# Patient Record
Sex: FEMALE | Race: WHITE | Employment: OTHER | URBAN - METROPOLITAN AREA
[De-identification: names, ages, dates, MRNs, and addresses within clinical notes are randomized per-mention and may not be internally consistent; named-entity substitution may affect disease eponyms.]

---

## 2017-12-11 PROBLEM — F02.80 DEMENTIA DUE TO PARKINSON'S DISEASE WITHOUT BEHAVIORAL DISTURBANCE (HCC): Status: ACTIVE | Noted: 2017-12-11

## 2017-12-11 PROBLEM — G20 DEMENTIA DUE TO PARKINSON'S DISEASE WITHOUT BEHAVIORAL DISTURBANCE (HCC): Status: ACTIVE | Noted: 2017-12-11

## 2017-12-11 PROBLEM — G20 PARKINSON'S DISEASE (HCC): Status: ACTIVE | Noted: 2017-12-11

## 2018-03-26 ENCOUNTER — NURSE ONLY (OUTPATIENT)
Dept: LAB | Age: 83
End: 2018-03-26
Attending: NURSE PRACTITIONER

## 2018-03-26 ENCOUNTER — SNF VISIT (OUTPATIENT)
Dept: INTERNAL MEDICINE CLINIC | Age: 83
End: 2018-03-26

## 2018-03-26 DIAGNOSIS — G20 PARKINSON'S DISEASE (HCC): ICD-10-CM

## 2018-03-26 DIAGNOSIS — I10 ESSENTIAL HYPERTENSION, MALIGNANT: ICD-10-CM

## 2018-03-26 DIAGNOSIS — I48.0 PAROXYSMAL ATRIAL FIBRILLATION (HCC): ICD-10-CM

## 2018-03-26 DIAGNOSIS — F02.80 DEMENTIA DUE TO PARKINSON'S DISEASE WITHOUT BEHAVIORAL DISTURBANCE (HCC): ICD-10-CM

## 2018-03-26 DIAGNOSIS — F02.80 ALZHEIMER'S DISEASE (HCC): ICD-10-CM

## 2018-03-26 DIAGNOSIS — Z95.0 CARDIAC PACEMAKER IN SITU: ICD-10-CM

## 2018-03-26 DIAGNOSIS — D68.52 HETEROZYGOUS FOR PROTHROMBIN G20210A MUTATION (HCC): Primary | ICD-10-CM

## 2018-03-26 DIAGNOSIS — G30.9 ALZHEIMER'S DISEASE (HCC): ICD-10-CM

## 2018-03-26 DIAGNOSIS — G20 DEMENTIA DUE TO PARKINSON'S DISEASE WITHOUT BEHAVIORAL DISTURBANCE (HCC): ICD-10-CM

## 2018-03-26 LAB
25-HYDROXYVITAMIN D (TOTAL): 19.3 NG/ML (ref 30–100)
ALBUMIN SERPL-MCNC: 2.9 G/DL (ref 3.5–4.8)
ALP LIVER SERPL-CCNC: 66 U/L (ref 55–142)
ALT SERPL-CCNC: 54 U/L (ref 14–54)
AST SERPL-CCNC: 41 U/L (ref 15–41)
BASOPHILS # BLD AUTO: 0.05 X10(3) UL (ref 0–0.1)
BASOPHILS NFR BLD AUTO: 0.8 %
BILIRUB SERPL-MCNC: 1.5 MG/DL (ref 0.1–2)
BUN BLD-MCNC: 14 MG/DL (ref 8–20)
CALCIUM BLD-MCNC: 8 MG/DL (ref 8.3–10.3)
CHLORIDE: 105 MMOL/L (ref 101–111)
CO2: 27 MMOL/L (ref 22–32)
CREAT BLD-MCNC: 0.8 MG/DL (ref 0.55–1.02)
EOSINOPHIL # BLD AUTO: 0.07 X10(3) UL (ref 0–0.3)
EOSINOPHIL NFR BLD AUTO: 1.1 %
ERYTHROCYTE [DISTWIDTH] IN BLOOD BY AUTOMATED COUNT: 17.1 % (ref 11.5–16)
GLUCOSE BLD-MCNC: 92 MG/DL (ref 70–99)
HAV IGM SER QL: 2.2 MG/DL (ref 1.7–3)
HCT VFR BLD AUTO: 39.5 % (ref 34–50)
HGB BLD-MCNC: 12.8 G/DL (ref 12–16)
IMMATURE GRANULOCYTE COUNT: 0.04 X10(3) UL (ref 0–1)
IMMATURE GRANULOCYTE RATIO %: 0.6 %
LYMPHOCYTES # BLD AUTO: 0.99 X10(3) UL (ref 0.9–4)
LYMPHOCYTES NFR BLD AUTO: 15.2 %
M PROTEIN MFR SERPL ELPH: 5.5 G/DL (ref 6.1–8.3)
MCH RBC QN AUTO: 30.3 PG (ref 27–33.2)
MCHC RBC AUTO-ENTMCNC: 32.4 G/DL (ref 31–37)
MCV RBC AUTO: 93.4 FL (ref 81–100)
MONOCYTES # BLD AUTO: 0.79 X10(3) UL (ref 0.1–1)
MONOCYTES NFR BLD AUTO: 12.1 %
NEUTROPHIL ABS PRELIM: 4.58 X10 (3) UL (ref 1.3–6.7)
NEUTROPHILS # BLD AUTO: 4.58 X10(3) UL (ref 1.3–6.7)
NEUTROPHILS NFR BLD AUTO: 70.2 %
PLATELET # BLD AUTO: 161 10(3)UL (ref 150–450)
POTASSIUM SERPL-SCNC: 3.5 MMOL/L (ref 3.6–5.1)
RBC # BLD AUTO: 4.23 X10(6)UL (ref 3.8–5.1)
RED CELL DISTRIBUTION WIDTH-SD: 58.6 FL (ref 35.1–46.3)
SODIUM SERPL-SCNC: 140 MMOL/L (ref 136–144)
WBC # BLD AUTO: 6.5 X10(3) UL (ref 4–13)

## 2018-03-26 PROCEDURE — 83735 ASSAY OF MAGNESIUM: CPT

## 2018-03-26 PROCEDURE — 82306 VITAMIN D 25 HYDROXY: CPT

## 2018-03-26 PROCEDURE — 99310 SBSQ NF CARE HIGH MDM 45: CPT | Performed by: NURSE PRACTITIONER

## 2018-03-26 PROCEDURE — 36415 COLL VENOUS BLD VENIPUNCTURE: CPT

## 2018-03-26 PROCEDURE — 80053 COMPREHEN METABOLIC PANEL: CPT

## 2018-03-26 PROCEDURE — 85025 COMPLETE CBC W/AUTO DIFF WBC: CPT

## 2018-03-26 NOTE — PROGRESS NOTES
Lawrence Fuentes  : 1934  Age 80year old  female patient is admitted to The [address] [place] [address] for rehabilitation due to chest pain and pacemaker replacement.      [facility] Admit date:  3/17/2018  Discharge date to [facility]:  3/23/2018  TATYANA 25 MG Oral Tab Take 1 tablet (25 mg total) by mouth nightly. Disp: 30 tablet Rfl: 1   Levothyroxine Sodium 100 MCG Oral Tab Take 1 tablet (100 mcg total) by mouth daily.  Disp: 90 tablet Rfl: 0   Multiple Vitamins-Minerals (EYE VITAMINS OR) Take 1 tablet by distention  LYMPHATIC:no lymphedema  MUSCULOSKELETAL:  Weakness and deconditioning  EXTREMITIES/VASCULAR:no cyanosis, clubbing or edema and radial pulses 2+  NEUROLOGIC: intact; no sensorimotor deficit, follows commands  PSYCHIATRIC: alert and oriented x 3 infarction or focal mass lesion. Intracranial atherosclerosis. Areas of decreased attenuation noted in the periventricular and subcortical white matter most consistent with chronic small vessel ischemic changes. Visualized paranasal sinuses are clear.  Righ note all Radiology and Diagnostic Imaging results are subject to interpretation according to your specific medical history and condition. The results in this report should be reviewed with your ordering or primary care physician as appropriate.     Janett Celeste HTN/AFIB/CAD  CHF SSS   Pacemaker- new with steri strips in place   No need for anticoagulation per cardiology due to falls   Fluid restriction of 64 ounces   Cardiac diet   Not to lift her arm above her head for 6 weeks.    toprol XL   History of ablat

## 2018-03-28 ENCOUNTER — NURSE ONLY (OUTPATIENT)
Dept: LAB | Age: 83
End: 2018-03-28
Attending: NURSE PRACTITIONER

## 2018-03-28 DIAGNOSIS — I48.91 ATRIAL FIBRILLATION (HCC): Primary | ICD-10-CM

## 2018-03-28 LAB
BUN BLD-MCNC: 13 MG/DL (ref 8–20)
CALCIUM BLD-MCNC: 8.1 MG/DL (ref 8.3–10.3)
CHLORIDE: 105 MMOL/L (ref 101–111)
CO2: 27 MMOL/L (ref 22–32)
CREAT BLD-MCNC: 0.69 MG/DL (ref 0.55–1.02)
GLUCOSE BLD-MCNC: 87 MG/DL (ref 70–99)
POTASSIUM SERPL-SCNC: 3.5 MMOL/L (ref 3.6–5.1)
SODIUM SERPL-SCNC: 138 MMOL/L (ref 136–144)

## 2018-03-28 PROCEDURE — 80048 BASIC METABOLIC PNL TOTAL CA: CPT

## 2018-03-29 ENCOUNTER — SNF VISIT (OUTPATIENT)
Dept: INTERNAL MEDICINE CLINIC | Age: 83
End: 2018-03-29

## 2018-03-29 VITALS
RESPIRATION RATE: 16 BRPM | DIASTOLIC BLOOD PRESSURE: 72 MMHG | OXYGEN SATURATION: 95 % | HEART RATE: 61 BPM | SYSTOLIC BLOOD PRESSURE: 154 MMHG | TEMPERATURE: 97 F

## 2018-03-29 DIAGNOSIS — F02.80 DEMENTIA DUE TO PARKINSON'S DISEASE WITHOUT BEHAVIORAL DISTURBANCE (HCC): ICD-10-CM

## 2018-03-29 DIAGNOSIS — G20 DEMENTIA DUE TO PARKINSON'S DISEASE WITHOUT BEHAVIORAL DISTURBANCE (HCC): ICD-10-CM

## 2018-03-29 DIAGNOSIS — Z91.81 RISK FOR FALLS: ICD-10-CM

## 2018-03-29 DIAGNOSIS — Z95.0 CARDIAC PACEMAKER IN SITU: Primary | ICD-10-CM

## 2018-03-29 DIAGNOSIS — G20 PARKINSON'S DISEASE (HCC): ICD-10-CM

## 2018-03-29 PROCEDURE — 99308 SBSQ NF CARE LOW MDM 20: CPT | Performed by: NURSE PRACTITIONER

## 2018-03-29 NOTE — PROGRESS NOTES
Johanna Cy, 38/1934, 80year old, female  Chief Complaint:  Patient presents with:   Follow - Up: s/p fall/angina and PPM replacement       Subjective:   PMH significant for dementia, MARIO on CPAP, A Fib s/p PPM, MR, HL, post-ablative hypothyroidism and Weakness and deconditioning  EXTREMITIES/VASCULAR:no cyanosis or clubbing, 2+ pedal edema and radial pulses 2+  NEUROLOGIC: intact; no sensorimotor deficit, follows commands  PSYCHIATRIC: alert and oriented x 1; recognizes family at bedside, affect appropr

## 2018-04-01 NOTE — PROGRESS NOTES
[address]: Michael Walls MD     1934 MRN [id number]   [facility]  Admission        [facility] Discharge   PCP Kellee Aceves 15 of Discharge         Date of Admission:    3/23/18  [facility] history of HTN, HL, Afib, CKD, Vitamin D ddef, pleural effusion Hypothyroidism, MARIO with cpap, lumbar disc disease, generalized weakness and physical deconditioning.    Patient denies any shortness of breath, denies chest pain and denies any recent fevers o Estimated body mass index is 23.17 kg/m² as calculated from the following:    Height as of 12/11/17: 64\". Weight as of 12/11/17: 135 lb. Wound: left chest wall, no infection along incisional site.      Physical Exam   Constitutional: She appears wel changes with history of dementia, encephalopathy, and pacemaker replacement. \  1. Dementia due to Parkinson's disease without behavioral disturbance ([facility] [address]  -continue with xanax and seroquel    2.  Paroxysmal atrial fibrillation (HCC)  -stable, continue wit

## 2018-04-04 ENCOUNTER — SNF DISCHARGE (OUTPATIENT)
Dept: INTERNAL MEDICINE CLINIC | Age: 83
End: 2018-04-04

## 2018-04-04 DIAGNOSIS — Z95.0 CARDIAC PACEMAKER IN SITU: Primary | ICD-10-CM

## 2018-04-04 PROCEDURE — 99315 NF DSCHRG MGMT 30 MIN/LESS: CPT | Performed by: NURSE PRACTITIONER

## 2018-04-06 NOTE — PROGRESS NOTES
Ebony Kim, 38/1934, 80year old, female is being discharged from Facility: [address] [place] [address] at [id number] [place] [address]    Date of Admission:3/23/2018    Date of Discharge: 4/4/2018                            Admitting Diagnoses: chest pain w deconditioning  EXTREMITIES/VASCULAR:no cyanosis, clubbing or edema and radial pulses 2+  NEUROLOGIC: intact; no sensorimotor deficit, follows commands  PSYCHIATRIC: alert and oriented x 3; affect appropriate    Skilled Nursing Facility Course:    [facility] bili   Monitor labs      Unwitnessed fall   Pt with dementia and impulsive behavior  CT negative      Hypothyroidism  Replacement     Vitamin D Deficiency   Replacement     Supplement  Multivitamin      Deconditioning/weakness  PT/OT to eval and treat  [facility]

## 2018-04-20 PROBLEM — D69.6 THROMBOCYTOPENIA (HCC): Status: ACTIVE | Noted: 2018-04-20

## 2018-04-20 PROBLEM — I27.20 PULMONARY HYPERTENSION (HCC): Status: ACTIVE | Noted: 2018-04-20

## 2018-11-05 PROCEDURE — 87186 SC STD MICRODIL/AGAR DIL: CPT | Performed by: OTHER

## 2018-11-05 PROCEDURE — 87088 URINE BACTERIA CULTURE: CPT | Performed by: OTHER

## 2018-11-05 PROCEDURE — 87086 URINE CULTURE/COLONY COUNT: CPT | Performed by: OTHER

## 2018-11-05 PROCEDURE — 81001 URINALYSIS AUTO W/SCOPE: CPT | Performed by: OTHER

## 2018-12-07 PROCEDURE — 81001 URINALYSIS AUTO W/SCOPE: CPT | Performed by: INTERNAL MEDICINE

## 2018-12-07 PROCEDURE — 87086 URINE CULTURE/COLONY COUNT: CPT | Performed by: INTERNAL MEDICINE

## 2019-04-24 PROCEDURE — 87086 URINE CULTURE/COLONY COUNT: CPT | Performed by: OTHER

## 2019-04-24 PROCEDURE — 81001 URINALYSIS AUTO W/SCOPE: CPT | Performed by: OTHER

## 2019-08-29 PROCEDURE — 81001 URINALYSIS AUTO W/SCOPE: CPT | Performed by: OTHER

## 2019-08-29 PROCEDURE — 87086 URINE CULTURE/COLONY COUNT: CPT | Performed by: OTHER

## 2019-12-23 ENCOUNTER — NURSE ONLY (OUTPATIENT)
Dept: LAB | Age: 84
End: 2019-12-23
Attending: FAMILY MEDICINE
Payer: MEDICARE

## 2019-12-23 ENCOUNTER — INITIAL APN SNF VISIT (OUTPATIENT)
Dept: INTERNAL MEDICINE CLINIC | Age: 84
End: 2019-12-23

## 2019-12-23 VITALS
TEMPERATURE: 99 F | BODY MASS INDEX: 25 KG/M2 | SYSTOLIC BLOOD PRESSURE: 136 MMHG | WEIGHT: 147 LBS | DIASTOLIC BLOOD PRESSURE: 57 MMHG | RESPIRATION RATE: 20 BRPM | OXYGEN SATURATION: 99 % | HEART RATE: 69 BPM

## 2019-12-23 DIAGNOSIS — I27.20 PULMONARY HYPERTENSION (HCC): ICD-10-CM

## 2019-12-23 DIAGNOSIS — F02.80 DEMENTIA DUE TO PARKINSON'S DISEASE WITHOUT BEHAVIORAL DISTURBANCE (HCC): ICD-10-CM

## 2019-12-23 DIAGNOSIS — G20 DEMENTIA DUE TO PARKINSON'S DISEASE WITHOUT BEHAVIORAL DISTURBANCE (HCC): ICD-10-CM

## 2019-12-23 DIAGNOSIS — E03.9 HYPOTHYROIDISM (ACQUIRED): ICD-10-CM

## 2019-12-23 DIAGNOSIS — R19.7 DIARRHEA OF PRESUMED INFECTIOUS ORIGIN: ICD-10-CM

## 2019-12-23 DIAGNOSIS — Z95.0 CARDIAC PACEMAKER IN SITU: ICD-10-CM
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
## 2019-12-23 DIAGNOSIS — G20 PARKINSON'S DISEASE (HCC): ICD-10-CM

## 2019-12-23 DIAGNOSIS — R53.81 PHYSICAL DECONDITIONING: ICD-10-CM

## 2019-12-23 DIAGNOSIS — Z96.641 HISTORY OF RIGHT HIP HEMIARTHROPLASTY: ICD-10-CM

## 2019-12-23 DIAGNOSIS — M62.81 MUSCLE WEAKNESS (GENERALIZED): Primary | ICD-10-CM

## 2019-12-23 PROCEDURE — 99309 SBSQ NF CARE MODERATE MDM 30: CPT | Performed by: NURSE PRACTITIONER

## 2019-12-23 PROCEDURE — 82306 VITAMIN D 25 HYDROXY: CPT

## 2019-12-23 PROCEDURE — 80053 COMPREHEN METABOLIC PANEL: CPT

## 2019-12-23 PROCEDURE — 85025 COMPLETE CBC W/AUTO DIFF WBC: CPT

## 2019-12-23 RX ORDER — BISACODYL 10 MG
10 SUPPOSITORY, RECTAL RECTAL DAILY PRN
COMMUNITY

## 2019-12-23 RX ORDER — ACETAMINOPHEN 325 MG/1
650 TABLET ORAL 3 TIMES DAILY
COMMUNITY

## 2019-12-23 NOTE — PROGRESS NOTES
Carron Door  : 1934  Age 80year old  female patient is admitted to Facility: The [facility] for [address] [place] [address] date:  2019  Discharge date to [facility]:  2019  ELOS:  18-21 days  Anticipated discharge date: tobacco: Never Used    Alcohol use: No      Alcohol/week: 0.0 standard drinks    Drug use: No      ALLERGIES:    Pcns [Penicillins]      RASH  Codeine                 NAUSEA AND VOMITING  Inderal [Propranolo*    OTHER (SEE COMMENTS)    Comment:migraine  Mo murmurs, no edema  ABDOMEN:  normal active BS+, soft, nondistended; no organomegaly, no masses; nontender, no guarding, no rebound tenderness.   :no suprapubic distension  LYMPHATIC:no lymphedema  MUSCULOSKELETAL: no acute synovitis upper or lower extremi safety and fall precautions  - repositioning    Hypothyroidism   - continue levothyroxine 100 mcg daily    HTN  - metoprolol 100 mg BID  - monitoring VS q shift and prn    Post op acute blood loss anemia  - Hgb trending up 9.2, 10.3  - no additional bleedi

## 2019-12-24 ENCOUNTER — EXTERNAL FACILITY (OUTPATIENT)
Dept: FAMILY MEDICINE CLINIC | Facility: CLINIC | Age: 84
End: 2019-12-24

## 2019-12-24 DIAGNOSIS — Z95.0 CARDIAC PACEMAKER IN SITU: ICD-10-CM

## 2019-12-24 DIAGNOSIS — E03.9 HYPOTHYROIDISM (ACQUIRED): ICD-10-CM

## 2019-12-24 DIAGNOSIS — K80.20 CALCULUS OF GALLBLADDER WITHOUT CHOLECYSTITIS WITHOUT OBSTRUCTION: ICD-10-CM

## 2019-12-24 DIAGNOSIS — I48.0 PAROXYSMAL ATRIAL FIBRILLATION (HCC): ICD-10-CM

## 2019-12-24 DIAGNOSIS — G20 DEMENTIA DUE TO PARKINSON'S DISEASE WITHOUT BEHAVIORAL DISTURBANCE (HCC): ICD-10-CM

## 2019-12-24 DIAGNOSIS — D69.6 THROMBOCYTOPENIA (HCC): ICD-10-CM

## 2019-12-24 DIAGNOSIS — Z99.89 OSA ON CPAP: ICD-10-CM

## 2019-12-24 DIAGNOSIS — Z87.81 S/P RIGHT HIP FRACTURE: ICD-10-CM

## 2019-12-24 DIAGNOSIS — I49.5 SINOATRIAL NODE DYSFUNCTION (HCC): ICD-10-CM

## 2019-12-24 DIAGNOSIS — Z51.81 MONITORING FOR LONG-TERM ANTICOAGULANT USE: ICD-10-CM

## 2019-12-24 DIAGNOSIS — I27.20 PULMONARY HYPERTENSION (HCC): ICD-10-CM

## 2019-12-24 DIAGNOSIS — M51.36 LUMBAR DEGENERATIVE DISC DISEASE: ICD-10-CM

## 2019-12-24 DIAGNOSIS — Z79.01 MONITORING FOR LONG-TERM ANTICOAGULANT USE: ICD-10-CM

## 2019-12-24 DIAGNOSIS — G20 PARKINSON'S DISEASE (HCC): ICD-10-CM

## 2019-12-24 DIAGNOSIS — E78.5 DYSLIPIDEMIA: ICD-10-CM

## 2019-12-24 DIAGNOSIS — G25.0 ESSENTIAL TREMOR: ICD-10-CM

## 2019-12-24 DIAGNOSIS — G47.33 OSA ON CPAP: ICD-10-CM

## 2019-12-24 DIAGNOSIS — Z96.649 S/P HIP HEMIARTHROPLASTY: Primary | ICD-10-CM

## 2019-12-24 DIAGNOSIS — F02.80 DEMENTIA DUE TO PARKINSON'S DISEASE WITHOUT BEHAVIORAL DISTURBANCE (HCC): ICD-10-CM

## 2019-12-24 PROCEDURE — 99306 1ST NF CARE HIGH MDM 50: CPT | Performed by: FAMILY MEDICINE

## 2019-12-26 PROBLEM — Z96.649 S/P HIP HEMIARTHROPLASTY: Status: ACTIVE | Noted: 2019-12-26

## 2019-12-26 PROBLEM — Z87.81 S/P RIGHT HIP FRACTURE: Status: ACTIVE | Noted: 2019-12-26

## 2019-12-26 NOTE — PROGRESS NOTES
[address]: Angel Nye MD     1934 MRN [id number]   [facility]  Admission        [facility] Discharge   PCP Mirna Bro [facility] of Discharge         Date of Admission:   19   Fa TAKE 1 & 1/2 (ONE & ONE-HALF) TABLETS BY MOUTH THREE TIMES DAILY  LEVOTHYROXINE SODIUM 100 MCG Oral Tab, TAKE 1 TABLET BY MOUTH ONCE DAILY  Multiple Vitamins-Minerals (EYE VITAMINS OR), Take 1 tablet by mouth daily.     No current facility-administered medi deviation. Cardiovascular:      Rate and Rhythm: Normal rate and regular rhythm. Heart sounds: Normal heart sounds. No murmur. No friction rub. No gallop. Pulmonary:      Effort: Pulmonary effort is normal. No respiratory distress.       Breath so use  Essential tremor  Hypothyroidism (acquired)  Lumbar degenerative disc disease  MARIO on CPAP  Dyslipidemia  Paroxysmal atrial fibrillation (HCC)  Cardiac pacemaker in situ-[employer]    -continue with PT/OT and wound care, constipation medication prn  -c

## 2019-12-27 ENCOUNTER — SNF VISIT (OUTPATIENT)
Dept: INTERNAL MEDICINE CLINIC | Age: 84
End: 2019-12-27

## 2019-12-27 VITALS
RESPIRATION RATE: 18 BRPM | TEMPERATURE: 98 F | HEART RATE: 67 BPM | OXYGEN SATURATION: 100 % | SYSTOLIC BLOOD PRESSURE: 129 MMHG | DIASTOLIC BLOOD PRESSURE: 73 MMHG

## 2019-12-27 DIAGNOSIS — G20 PARKINSON'S DISEASE (HCC): ICD-10-CM

## 2019-12-27 DIAGNOSIS — Z87.81 S/P RIGHT HIP FRACTURE: ICD-10-CM

## 2019-12-27 DIAGNOSIS — R53.81 PHYSICAL DECONDITIONING: ICD-10-CM

## 2019-12-27 DIAGNOSIS — E03.9 HYPOTHYROIDISM (ACQUIRED): ICD-10-CM

## 2019-12-27 DIAGNOSIS — G20 DEMENTIA DUE TO PARKINSON'S DISEASE WITHOUT BEHAVIORAL DISTURBANCE (HCC): ICD-10-CM

## 2019-12-27 DIAGNOSIS — F02.80 DEMENTIA DUE TO PARKINSON'S DISEASE WITHOUT BEHAVIORAL DISTURBANCE (HCC): ICD-10-CM

## 2019-12-27 DIAGNOSIS — I27.20 PULMONARY HYPERTENSION (HCC): ICD-10-CM

## 2019-12-27 DIAGNOSIS — Z96.649 S/P HIP HEMIARTHROPLASTY: Primary | ICD-10-CM

## 2019-12-27 PROCEDURE — 99308 SBSQ NF CARE LOW MDM 20: CPT | Performed by: NURSE PRACTITIONER

## 2019-12-27 NOTE — PROGRESS NOTES
Marge Raymundo, 38/1934, 80year old, female    Chief Complaint:  Patient presents with:   Follow - Up: fall right hip fracture s/p hemiarthroplasty, Afib, PD, Demenita, HTN, Anemia, Hypothyroidism  Weakness       Subjective:    Patient is an 80year old fe 10.3 (L) 12/23/2019    HCT 32.6 (L) 12/23/2019    MCV 95.3 12/23/2019    MCH 30.1 12/23/2019    MCHC 31.6 12/23/2019    RDW 15.4 (H) 12/23/2019    .0 12/23/2019     Lab Results   Component Value Date    GLU 92 12/23/2019    BUN 19 (H) 12/23/2019 weeks per ortho     GI prophylaxis  famotidine daily     Labs  CBC, CMP weekly        Follow Ups:  PCP within 7 days of DC  Ortho in 2 weeks Dr Judi Aguirre, APRN  12/27/2019

## 2019-12-30 ENCOUNTER — NURSE ONLY (OUTPATIENT)
Dept: LAB | Age: 84
End: 2019-12-30
Attending: FAMILY MEDICINE
Payer: MEDICARE

## 2019-12-30 DIAGNOSIS — R26.2 CANNOT WALK: Primary | ICD-10-CM

## 2019-12-30 LAB
ALBUMIN SERPL-MCNC: 2.2 G/DL (ref 3.4–5)
ALBUMIN/GLOB SERPL: 0.7 {RATIO} (ref 1–2)
ALP LIVER SERPL-CCNC: 48 U/L (ref 55–142)
ALT SERPL-CCNC: 17 U/L (ref 13–56)
ANION GAP SERPL CALC-SCNC: 7 MMOL/L (ref 0–18)
AST SERPL-CCNC: 59 U/L (ref 15–37)
BASOPHILS # BLD AUTO: 0.03 X10(3) UL (ref 0–0.2)
BASOPHILS NFR BLD AUTO: 0.8 %
BILIRUB SERPL-MCNC: 0.5 MG/DL (ref 0.1–2)
BUN BLD-MCNC: 16 MG/DL (ref 7–18)
BUN/CREAT SERPL: 13.1 (ref 10–20)
CALCIUM BLD-MCNC: 8.6 MG/DL (ref 8.5–10.1)
CHLORIDE SERPL-SCNC: 110 MMOL/L (ref 98–112)
CO2 SERPL-SCNC: 24 MMOL/L (ref 21–32)
CREAT BLD-MCNC: 1.22 MG/DL (ref 0.55–1.02)
DEPRECATED RDW RBC AUTO: 53.4 FL (ref 35.1–46.3)
EOSINOPHIL # BLD AUTO: 0.17 X10(3) UL (ref 0–0.7)
EOSINOPHIL NFR BLD AUTO: 4.7 %
ERYTHROCYTE [DISTWIDTH] IN BLOOD BY AUTOMATED COUNT: 15.8 % (ref 11–15)
GLOBULIN PLAS-MCNC: 3.2 G/DL (ref 2.8–4.4)
GLUCOSE BLD-MCNC: 83 MG/DL (ref 70–99)
HCT VFR BLD AUTO: 29.1 % (ref 35–48)
HGB BLD-MCNC: 9.3 G/DL (ref 12–16)
IMM GRANULOCYTES # BLD AUTO: 0.03 X10(3) UL (ref 0–1)
IMM GRANULOCYTES NFR BLD: 0.8 %
LYMPHOCYTES # BLD AUTO: 1.38 X10(3) UL (ref 1–4)
LYMPHOCYTES NFR BLD AUTO: 37.8 %
M PROTEIN MFR SERPL ELPH: 5.4 G/DL (ref 6.4–8.2)
MCH RBC QN AUTO: 29.9 PG (ref 26–34)
MCHC RBC AUTO-ENTMCNC: 32 G/DL (ref 31–37)
MCV RBC AUTO: 93.6 FL (ref 80–100)
MONOCYTES # BLD AUTO: 0.52 X10(3) UL (ref 0.1–1)
MONOCYTES NFR BLD AUTO: 14.2 %
NEUTROPHILS # BLD AUTO: 1.52 X10 (3) UL (ref 1.5–7.7)
NEUTROPHILS # BLD AUTO: 1.52 X10(3) UL (ref 1.5–7.7)
NEUTROPHILS NFR BLD AUTO: 41.7 %
OSMOLALITY SERPL CALC.SUM OF ELEC: 292 MOSM/KG (ref 275–295)
PATIENT FASTING Y/N/NP: YES
PLATELET # BLD AUTO: 282 10(3)UL (ref 150–450)
POTASSIUM SERPL-SCNC: 4.3 MMOL/L (ref 3.5–5.1)
RBC # BLD AUTO: 3.11 X10(6)UL (ref 3.8–5.3)
SODIUM SERPL-SCNC: 141 MMOL/L (ref 136–145)
WBC # BLD AUTO: 3.7 X10(3) UL (ref 4–11)

## 2019-12-30 PROCEDURE — 80053 COMPREHEN METABOLIC PANEL: CPT

## 2019-12-30 PROCEDURE — 85025 COMPLETE CBC W/AUTO DIFF WBC: CPT

## 2019-12-31 ENCOUNTER — SNF VISIT (OUTPATIENT)
Dept: INTERNAL MEDICINE CLINIC | Age: 84
End: 2019-12-31

## 2019-12-31 VITALS
HEART RATE: 69 BPM | OXYGEN SATURATION: 99 % | TEMPERATURE: 97 F | DIASTOLIC BLOOD PRESSURE: 56 MMHG | RESPIRATION RATE: 18 BRPM | SYSTOLIC BLOOD PRESSURE: 104 MMHG

## 2019-12-31 DIAGNOSIS — E03.9 HYPOTHYROIDISM (ACQUIRED): ICD-10-CM

## 2019-12-31 DIAGNOSIS — G20 PARKINSON'S DISEASE (HCC): ICD-10-CM

## 2019-12-31 DIAGNOSIS — I27.20 PULMONARY HYPERTENSION (HCC): ICD-10-CM

## 2019-12-31 DIAGNOSIS — F02.80 DEMENTIA DUE TO PARKINSON'S DISEASE WITHOUT BEHAVIORAL DISTURBANCE (HCC): ICD-10-CM

## 2019-12-31 DIAGNOSIS — Z96.649 S/P HIP HEMIARTHROPLASTY: Primary | ICD-10-CM

## 2019-12-31 DIAGNOSIS — Z87.81 S/P RIGHT HIP FRACTURE: ICD-10-CM

## 2019-12-31 DIAGNOSIS — I48.0 PAROXYSMAL ATRIAL FIBRILLATION (HCC): ICD-10-CM

## 2019-12-31 DIAGNOSIS — G20 DEMENTIA DUE TO PARKINSON'S DISEASE WITHOUT BEHAVIORAL DISTURBANCE (HCC): ICD-10-CM

## 2019-12-31 DIAGNOSIS — Z91.81 RISK FOR FALLS: ICD-10-CM

## 2019-12-31 PROCEDURE — 99309 SBSQ NF CARE MODERATE MDM 30: CPT | Performed by: NURSE PRACTITIONER

## 2019-12-31 NOTE — PROGRESS NOTES
Markryle Gerry, 38/1934, 80year old, female    Chief Complaint:  Patient presents with:   Follow - Up: fall right hip fracture s/p hemiarthroplasty, Afib, PD, Demenita, HTN, Anemia, Hypothyroidism  Weakness  Lab Results       Subjective:    Patient is an 8 reviewed:    Lab Results   Component Value Date    WBC 3.7 (L) 12/30/2019    RBC 3.11 (L) 12/30/2019    HGB 9.3 (L) 12/30/2019    HCT 29.1 (L) 12/30/2019    MCV 93.6 12/30/2019    MCH 29.9 12/30/2019    MCHC 32.0 12/30/2019    RDW 15.8 (H) 12/30/2019    PL bedside today    Balbina Méndez, APRN  12/31/2019

## 2020-01-02 ENCOUNTER — NURSE ONLY (OUTPATIENT)
Dept: LAB | Age: 85
End: 2020-01-02
Attending: FAMILY MEDICINE
Payer: MEDICARE

## 2020-01-02 DIAGNOSIS — M62.81 MUSCLE WEAKNESS (GENERALIZED): Primary | ICD-10-CM

## 2020-01-02 LAB
ALBUMIN SERPL-MCNC: 2.7 G/DL (ref 3.4–5)
ALBUMIN/GLOB SERPL: 0.8 {RATIO} (ref 1–2)
ALP LIVER SERPL-CCNC: 62 U/L (ref 55–142)
ALT SERPL-CCNC: 22 U/L (ref 13–56)
ANION GAP SERPL CALC-SCNC: 5 MMOL/L (ref 0–18)
AST SERPL-CCNC: 46 U/L (ref 15–37)
BASOPHILS # BLD AUTO: 0.03 X10(3) UL (ref 0–0.2)
BASOPHILS NFR BLD AUTO: 0.9 %
BILIRUB SERPL-MCNC: 0.6 MG/DL (ref 0.1–2)
BUN BLD-MCNC: 21 MG/DL (ref 7–18)
BUN/CREAT SERPL: 17.6 (ref 10–20)
CALCIUM BLD-MCNC: 8.8 MG/DL (ref 8.5–10.1)
CHLORIDE SERPL-SCNC: 108 MMOL/L (ref 98–112)
CO2 SERPL-SCNC: 25 MMOL/L (ref 21–32)
CREAT BLD-MCNC: 1.19 MG/DL (ref 0.55–1.02)
DEPRECATED HBV CORE AB SER IA-ACNC: 306.5 NG/ML (ref 18–340)
DEPRECATED RDW RBC AUTO: 54.1 FL (ref 35.1–46.3)
EOSINOPHIL # BLD AUTO: 0.14 X10(3) UL (ref 0–0.7)
EOSINOPHIL NFR BLD AUTO: 4.1 %
ERYTHROCYTE [DISTWIDTH] IN BLOOD BY AUTOMATED COUNT: 15.7 % (ref 11–15)
FOLATE SERPL-MCNC: 22.3 NG/ML (ref 8.7–?)
GLOBULIN PLAS-MCNC: 3.5 G/DL (ref 2.8–4.4)
GLUCOSE BLD-MCNC: 101 MG/DL (ref 70–99)
HCT VFR BLD AUTO: 32.4 % (ref 35–48)
HGB BLD-MCNC: 10.2 G/DL (ref 12–16)
IMM GRANULOCYTES # BLD AUTO: 0.03 X10(3) UL (ref 0–1)
IMM GRANULOCYTES NFR BLD: 0.9 %
IRON SATURATION: 14 % (ref 15–50)
IRON SERPL-MCNC: 41 UG/DL (ref 50–170)
LYMPHOCYTES # BLD AUTO: 1.05 X10(3) UL (ref 1–4)
LYMPHOCYTES NFR BLD AUTO: 30.9 %
M PROTEIN MFR SERPL ELPH: 6.2 G/DL (ref 6.4–8.2)
MCH RBC QN AUTO: 29.9 PG (ref 26–34)
MCHC RBC AUTO-ENTMCNC: 31.5 G/DL (ref 31–37)
MCV RBC AUTO: 95 FL (ref 80–100)
MONOCYTES # BLD AUTO: 0.41 X10(3) UL (ref 0.1–1)
MONOCYTES NFR BLD AUTO: 12.1 %
NEUTROPHILS # BLD AUTO: 1.74 X10 (3) UL (ref 1.5–7.7)
NEUTROPHILS # BLD AUTO: 1.74 X10(3) UL (ref 1.5–7.7)
NEUTROPHILS NFR BLD AUTO: 51.1 %
OSMOLALITY SERPL CALC.SUM OF ELEC: 289 MOSM/KG (ref 275–295)
PATIENT FASTING Y/N/NP: YES
PLATELET # BLD AUTO: 287 10(3)UL (ref 150–450)
POTASSIUM SERPL-SCNC: 4.3 MMOL/L (ref 3.5–5.1)
RBC # BLD AUTO: 3.41 X10(6)UL (ref 3.8–5.3)
SODIUM SERPL-SCNC: 138 MMOL/L (ref 136–145)
TOTAL IRON BINDING CAPACITY: 295 UG/DL (ref 240–450)
TRANSFERRIN SERPL-MCNC: 198 MG/DL (ref 200–360)
VIT B12 SERPL-MCNC: 1445 PG/ML (ref 193–986)
WBC # BLD AUTO: 3.4 X10(3) UL (ref 4–11)

## 2020-01-02 PROCEDURE — 80053 COMPREHEN METABOLIC PANEL: CPT

## 2020-01-02 PROCEDURE — 85025 COMPLETE CBC W/AUTO DIFF WBC: CPT

## 2020-01-02 PROCEDURE — 82728 ASSAY OF FERRITIN: CPT

## 2020-01-02 PROCEDURE — 82746 ASSAY OF FOLIC ACID SERUM: CPT

## 2020-01-02 PROCEDURE — 83550 IRON BINDING TEST: CPT

## 2020-01-02 PROCEDURE — 83540 ASSAY OF IRON: CPT

## 2020-01-02 PROCEDURE — 82607 VITAMIN B-12: CPT

## 2020-01-06 ENCOUNTER — SNF DISCHARGE (OUTPATIENT)
Dept: INTERNAL MEDICINE CLINIC | Age: 85
End: 2020-01-06

## 2020-01-06 ENCOUNTER — NURSE ONLY (OUTPATIENT)
Dept: LAB | Age: 85
End: 2020-01-06
Attending: FAMILY MEDICINE
Payer: MEDICARE

## 2020-01-06 VITALS
HEART RATE: 75 BPM | RESPIRATION RATE: 20 BRPM | TEMPERATURE: 97 F | DIASTOLIC BLOOD PRESSURE: 57 MMHG | OXYGEN SATURATION: 99 % | SYSTOLIC BLOOD PRESSURE: 114 MMHG

## 2020-01-06 DIAGNOSIS — N18.9 CHRONIC KIDNEY DISEASE, UNSPECIFIED: Primary | ICD-10-CM

## 2020-01-06 DIAGNOSIS — G20 DEMENTIA DUE TO PARKINSON'S DISEASE WITHOUT BEHAVIORAL DISTURBANCE (HCC): ICD-10-CM

## 2020-01-06 DIAGNOSIS — Z91.81 RISK FOR FALLS: ICD-10-CM

## 2020-01-06 DIAGNOSIS — I48.0 PAROXYSMAL ATRIAL FIBRILLATION (HCC): ICD-10-CM

## 2020-01-06 DIAGNOSIS — F02.80 DEMENTIA DUE TO PARKINSON'S DISEASE WITHOUT BEHAVIORAL DISTURBANCE (HCC): ICD-10-CM

## 2020-01-06 DIAGNOSIS — Z96.649 S/P HIP HEMIARTHROPLASTY: Primary | ICD-10-CM

## 2020-01-06 DIAGNOSIS — E03.9 HYPOTHYROIDISM (ACQUIRED): ICD-10-CM

## 2020-01-06 DIAGNOSIS — Z87.81 S/P RIGHT HIP FRACTURE: ICD-10-CM

## 2020-01-06 DIAGNOSIS — G20 PARKINSON'S DISEASE (HCC): ICD-10-CM

## 2020-01-06 LAB
ALBUMIN SERPL-MCNC: 2.7 G/DL (ref 3.4–5)
ALBUMIN/GLOB SERPL: 0.8 {RATIO} (ref 1–2)
ALP LIVER SERPL-CCNC: 60 U/L (ref 55–142)
ALT SERPL-CCNC: 21 U/L (ref 13–56)
ANION GAP SERPL CALC-SCNC: 5 MMOL/L (ref 0–18)
AST SERPL-CCNC: 28 U/L (ref 15–37)
BASOPHILS # BLD AUTO: 0.04 X10(3) UL (ref 0–0.2)
BASOPHILS NFR BLD AUTO: 1.1 %
BILIRUB SERPL-MCNC: 0.5 MG/DL (ref 0.1–2)
BUN BLD-MCNC: 23 MG/DL (ref 7–18)
BUN/CREAT SERPL: 20.4 (ref 10–20)
CALCIUM BLD-MCNC: 8.8 MG/DL (ref 8.5–10.1)
CHLORIDE SERPL-SCNC: 108 MMOL/L (ref 98–112)
CO2 SERPL-SCNC: 27 MMOL/L (ref 21–32)
CREAT BLD-MCNC: 1.13 MG/DL (ref 0.55–1.02)
DEPRECATED RDW RBC AUTO: 56.8 FL (ref 35.1–46.3)
EOSINOPHIL # BLD AUTO: 0.15 X10(3) UL (ref 0–0.7)
EOSINOPHIL NFR BLD AUTO: 4.1 %
ERYTHROCYTE [DISTWIDTH] IN BLOOD BY AUTOMATED COUNT: 15.9 % (ref 11–15)
GLOBULIN PLAS-MCNC: 3.4 G/DL (ref 2.8–4.4)
GLUCOSE BLD-MCNC: 90 MG/DL (ref 70–99)
HCT VFR BLD AUTO: 31.7 % (ref 35–48)
HGB BLD-MCNC: 9.7 G/DL (ref 12–16)
IMM GRANULOCYTES # BLD AUTO: 0.03 X10(3) UL (ref 0–1)
IMM GRANULOCYTES NFR BLD: 0.8 %
LYMPHOCYTES # BLD AUTO: 1.46 X10(3) UL (ref 1–4)
LYMPHOCYTES NFR BLD AUTO: 39.8 %
M PROTEIN MFR SERPL ELPH: 6.1 G/DL (ref 6.4–8.2)
MCH RBC QN AUTO: 29.7 PG (ref 26–34)
MCHC RBC AUTO-ENTMCNC: 30.6 G/DL (ref 31–37)
MCV RBC AUTO: 96.9 FL (ref 80–100)
MONOCYTES # BLD AUTO: 0.41 X10(3) UL (ref 0.1–1)
MONOCYTES NFR BLD AUTO: 11.2 %
NEUTROPHILS # BLD AUTO: 1.58 X10 (3) UL (ref 1.5–7.7)
NEUTROPHILS # BLD AUTO: 1.58 X10(3) UL (ref 1.5–7.7)
NEUTROPHILS NFR BLD AUTO: 43 %
OSMOLALITY SERPL CALC.SUM OF ELEC: 293 MOSM/KG (ref 275–295)
PATIENT FASTING Y/N/NP: YES
PLATELET # BLD AUTO: 226 10(3)UL (ref 150–450)
POTASSIUM SERPL-SCNC: 4.2 MMOL/L (ref 3.5–5.1)
RBC # BLD AUTO: 3.27 X10(6)UL (ref 3.8–5.3)
SODIUM SERPL-SCNC: 140 MMOL/L (ref 136–145)
WBC # BLD AUTO: 3.7 X10(3) UL (ref 4–11)

## 2020-01-06 PROCEDURE — 80053 COMPREHEN METABOLIC PANEL: CPT

## 2020-01-06 PROCEDURE — 99316 NF DSCHRG MGMT 30 MIN+: CPT | Performed by: NURSE PRACTITIONER

## 2020-01-06 PROCEDURE — 85025 COMPLETE CBC W/AUTO DIFF WBC: CPT

## 2020-01-06 NOTE — PROGRESS NOTES
Maeve [facility], 38/1934, 80year old, female is being discharged from [address] [place] [address] at [id number] [facility]    Date of Admission: 12/20/2019    Date of Discharge: 1/8/2020                                 Admitting Diagnoses:  Fall prescribed.   · Home with home care services RN/PT/OT/ST/[facility]  · Equipment per PT recommendations: WC    Most recent lab results:  Lab Results   Component Value Date    WBC 3.7 (L) 01/06/2020    RBC 3.27 (L) 01/06/2020    HGB 9.7 (L) 01/06/2020    HCT mcg daily     HTN  - metoprolol BP was low, decreased to 25 mg BID & hold for SBP < 110 or pulse < 60  - monitoring VS q shift and prn     Post op acute blood loss anemia iron deficiency  - Hgb  9.2, 10.3, 9.3  - no additional bleeding noted  - iron added

## 2020-01-07 ENCOUNTER — EXTERNAL FACILITY (OUTPATIENT)
Dept: FAMILY MEDICINE CLINIC | Facility: CLINIC | Age: 85
End: 2020-01-07

## 2020-01-07 DIAGNOSIS — F02.80 DEMENTIA DUE TO PARKINSON'S DISEASE WITHOUT BEHAVIORAL DISTURBANCE (HCC): ICD-10-CM

## 2020-01-07 DIAGNOSIS — D69.6 THROMBOCYTOPENIA (HCC): ICD-10-CM

## 2020-01-07 DIAGNOSIS — Z99.89 OSA ON CPAP: ICD-10-CM

## 2020-01-07 DIAGNOSIS — K80.20 CALCULUS OF GALLBLADDER WITHOUT CHOLECYSTITIS WITHOUT OBSTRUCTION: ICD-10-CM

## 2020-01-07 DIAGNOSIS — M51.36 LUMBAR DEGENERATIVE DISC DISEASE: ICD-10-CM

## 2020-01-07 DIAGNOSIS — I48.0 PAROXYSMAL ATRIAL FIBRILLATION (HCC): ICD-10-CM

## 2020-01-07 DIAGNOSIS — E03.9 HYPOTHYROIDISM (ACQUIRED): ICD-10-CM

## 2020-01-07 DIAGNOSIS — Z95.0 CARDIAC PACEMAKER IN SITU: ICD-10-CM

## 2020-01-07 DIAGNOSIS — G20 DEMENTIA DUE TO PARKINSON'S DISEASE WITHOUT BEHAVIORAL DISTURBANCE (HCC): ICD-10-CM

## 2020-01-07 DIAGNOSIS — M85.80 OSTEOPENIA, UNSPECIFIED LOCATION: ICD-10-CM

## 2020-01-07 DIAGNOSIS — I27.20 PULMONARY HYPERTENSION (HCC): ICD-10-CM

## 2020-01-07 DIAGNOSIS — Z79.01 MONITORING FOR LONG-TERM ANTICOAGULANT USE: ICD-10-CM

## 2020-01-07 DIAGNOSIS — G25.0 ESSENTIAL TREMOR: ICD-10-CM

## 2020-01-07 DIAGNOSIS — G47.33 OSA ON CPAP: ICD-10-CM

## 2020-01-07 DIAGNOSIS — Z51.81 MONITORING FOR LONG-TERM ANTICOAGULANT USE: ICD-10-CM

## 2020-01-07 DIAGNOSIS — S32.030S COMPRESSION FRACTURE OF L3 LUMBAR VERTEBRA, SEQUELA: ICD-10-CM

## 2020-01-07 DIAGNOSIS — Z96.649 S/P HIP HEMIARTHROPLASTY: ICD-10-CM

## 2020-01-07 DIAGNOSIS — E78.5 DYSLIPIDEMIA: ICD-10-CM

## 2020-01-07 DIAGNOSIS — G20 PARKINSON'S DISEASE (HCC): ICD-10-CM

## 2020-01-07 DIAGNOSIS — I49.5 SINOATRIAL NODE DYSFUNCTION (HCC): ICD-10-CM

## 2020-01-07 DIAGNOSIS — Z87.81 S/P RIGHT HIP FRACTURE: Primary | ICD-10-CM

## 2020-01-07 PROCEDURE — 99308 SBSQ NF CARE LOW MDM 20: CPT | Performed by: FAMILY MEDICINE

## 2020-01-10 ENCOUNTER — SNF VISIT (OUTPATIENT)
Dept: INTERNAL MEDICINE CLINIC | Age: 85
End: 2020-01-10

## 2020-01-10 VITALS
TEMPERATURE: 98 F | WEIGHT: 136.69 LBS | SYSTOLIC BLOOD PRESSURE: 113 MMHG | BODY MASS INDEX: 23 KG/M2 | RESPIRATION RATE: 19 BRPM | OXYGEN SATURATION: 96 % | DIASTOLIC BLOOD PRESSURE: 56 MMHG | HEART RATE: 77 BPM

## 2020-01-10 DIAGNOSIS — G20 DEMENTIA DUE TO PARKINSON'S DISEASE WITHOUT BEHAVIORAL DISTURBANCE (HCC): ICD-10-CM

## 2020-01-10 DIAGNOSIS — Z91.81 RISK FOR FALLS: ICD-10-CM

## 2020-01-10 DIAGNOSIS — I48.0 PAROXYSMAL ATRIAL FIBRILLATION (HCC): ICD-10-CM

## 2020-01-10 DIAGNOSIS — E03.9 HYPOTHYROIDISM (ACQUIRED): ICD-10-CM

## 2020-01-10 DIAGNOSIS — E44.0 MODERATE PROTEIN-CALORIE MALNUTRITION (HCC): ICD-10-CM

## 2020-01-10 DIAGNOSIS — G20 PARKINSON'S DISEASE (HCC): ICD-10-CM

## 2020-01-10 DIAGNOSIS — Z87.81 S/P RIGHT HIP FRACTURE: ICD-10-CM

## 2020-01-10 DIAGNOSIS — Z96.649 S/P HIP HEMIARTHROPLASTY: Primary | ICD-10-CM

## 2020-01-10 DIAGNOSIS — F02.80 DEMENTIA DUE TO PARKINSON'S DISEASE WITHOUT BEHAVIORAL DISTURBANCE (HCC): ICD-10-CM

## 2020-01-10 PROCEDURE — 99308 SBSQ NF CARE LOW MDM 20: CPT | Performed by: NURSE PRACTITIONER

## 2020-01-10 NOTE — PROGRESS NOTES
Estrellita Kraus, 38/1934, 80year old, female    Chief Complaint:  Patient presents with:   Follow - Up: fall with femur fx s/p arthroplasty       Subjective:    Patient is an 80year old female with advanced dementia, HTN, PD< hypothyroidism, Afib who fell MCV 96.9 01/06/2020    MCH 29.7 01/06/2020    MCHC 30.6 (L) 01/06/2020    RDW 15.9 (H) 01/06/2020    .0 01/06/2020     Lab Results   Component Value Date    GLU 90 01/06/2020    BUN 23 (H) 01/06/2020    BUNCREA 20.4 (H) 01/06/2020    CREATSERUM 1 Linda Escalona, APRN  1/10/2020

## 2020-01-12 NOTE — PROGRESS NOTES
[address]: Bertha Hawley MD     1934 MRN [id number]   [facility]  Admission        [facility] Discharge   PCP Benito Lao [facility] of Discharge         Date of Admission:   19   Fa by mouth daily as needed for constipation.   METOPROLOL SUCCINATE  MG Oral Tablet 24 Hr, TAKE 1 TABLET BY MOUTH TWICE DAILY (Patient taking differently: Take 25 mg by mouth 2 (two) times daily.  )  CARBIDOPA-LEVODOPA  MG Oral Tab, TAKE 1 & 1/2 ( Left eye: No discharge. Pupils: Pupils are equal, round, and reactive to light. Neck:      Musculoskeletal: Normal range of motion and neck supple. Thyroid: No thyromegaly. Vascular: No JVD. Trachea: No tracheal deviation.    Card obstruction  Osteopenia, unspecified location  Lumbar degenerative disc disease  Hypothyroidism (acquired)  MARIO on CPAP  Dyslipidemia  Sinoatrial node dysfunction (HCC)  Paroxysmal atrial fibrillation (HCC)  Cardiac pacemaker in situ-[employer]    -continue

## 2020-01-15 ENCOUNTER — SNF DISCHARGE (OUTPATIENT)
Dept: INTERNAL MEDICINE CLINIC | Age: 85
End: 2020-01-15

## 2020-01-15 VITALS
HEART RATE: 78 BPM | OXYGEN SATURATION: 93 % | RESPIRATION RATE: 18 BRPM | DIASTOLIC BLOOD PRESSURE: 59 MMHG | TEMPERATURE: 98 F | SYSTOLIC BLOOD PRESSURE: 112 MMHG | BODY MASS INDEX: 24 KG/M2 | WEIGHT: 137.81 LBS

## 2020-01-15 DIAGNOSIS — F02.80 DEMENTIA DUE TO PARKINSON'S DISEASE WITHOUT BEHAVIORAL DISTURBANCE (HCC): ICD-10-CM

## 2020-01-15 DIAGNOSIS — E44.0 MODERATE PROTEIN-CALORIE MALNUTRITION (HCC): ICD-10-CM

## 2020-01-15 DIAGNOSIS — E03.9 HYPOTHYROIDISM (ACQUIRED): ICD-10-CM

## 2020-01-15 DIAGNOSIS — G20 PARKINSON'S DISEASE (HCC): ICD-10-CM

## 2020-01-15 DIAGNOSIS — I27.20 PULMONARY HYPERTENSION (HCC): ICD-10-CM

## 2020-01-15 DIAGNOSIS — I48.0 PAROXYSMAL ATRIAL FIBRILLATION (HCC): ICD-10-CM

## 2020-01-15 DIAGNOSIS — R53.81 PHYSICAL DECONDITIONING: ICD-10-CM

## 2020-01-15 DIAGNOSIS — G20 DEMENTIA DUE TO PARKINSON'S DISEASE WITHOUT BEHAVIORAL DISTURBANCE (HCC): ICD-10-CM

## 2020-01-15 DIAGNOSIS — Z96.649 S/P HIP HEMIARTHROPLASTY: Primary | ICD-10-CM

## 2020-01-15 DIAGNOSIS — Z87.81 S/P RIGHT HIP FRACTURE: ICD-10-CM

## 2020-01-15 PROCEDURE — 99316 NF DSCHRG MGMT 30 MIN+: CPT | Performed by: NURSE PRACTITIONER

## 2020-01-15 NOTE — PROGRESS NOTES
Katy Mcadams, 38/1934, 80year old, female is being discharged from [address] [place] [address] at [address] [facility]    Date of Admission: 12/20/2019    Date of Discharge:1/15/2020                                 Admitting Diagnoses:  Fall per PT recommendations: WC      Most recent lab results:    Lab Results   Component Value Date    WBC 3.7 (L) 01/06/2020    RBC 3.27 (L) 01/06/2020    HGB 9.7 (L) 01/06/2020    HCT 31.7 (L) 01/06/2020    MCV 96.9 01/06/2020    MCH 29.7 01/06/2020    MCHC 3 additional bleeding noted  - iron added daily for low iron and iron sat  - monitoring     DVT prophylaxis  Eliquis 2.5 mg BID x4 weeks per ortho     GI prophylaxis  famotidine daily increased to BID     Labs  CBC, CMP weekly     Follow Ups:  PCP within 7 d

## 2020-01-20 PROBLEM — N18.30 CHRONIC KIDNEY DISEASE, STAGE 3 (MODERATE): Status: ACTIVE | Noted: 2020-01-20

## 2020-05-29 PROBLEM — D50.9 IRON DEFICIENCY ANEMIA: Status: ACTIVE | Noted: 2020-01-01
